# Patient Record
Sex: FEMALE | Race: WHITE | Employment: STUDENT | URBAN - METROPOLITAN AREA
[De-identification: names, ages, dates, MRNs, and addresses within clinical notes are randomized per-mention and may not be internally consistent; named-entity substitution may affect disease eponyms.]

---
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
## 2022-01-12 NOTE — ED PROVIDER NOTES
Emergency Department Provider Note  Location: [facility]  ED  4/23/2019     Patient Identification  Audie Solorio is a 23 y.o. female    Chief Complaint  Numbness (Woke up around 4:30 to go to the bathroom, felt right sided numbness along body. Right side of face and B forehead also affected, reported right side facial weakness and tongue numbness. Pt.'s mother reports that she was not making sense, couldnt complete words or sentences. Now says that she seems improved.)      Mode of Arrival  private car    HPI  (History provided by patient)  This is a 23 y.o. female presented today for right sided numbness. Patient says she fell asleep around 11pm and woke up around 4:30 to 5 AM.  She was laying on her right side. She woke up noticing the right side of her body, including her face, arm, and leg feel numb. She initially thought it was because she was laying on her right side. She got up and walk around for a couple minutes and the sensation did not resolve. She panicked and went to her mom's room asking for help. She says she couldn't even complete her sentences because she was panicking. No other she calmed down she felt a little better. She states the numbness in the leg has resolved but she still has right-sided facial and arm numbness. She also has a left-sided headache but that is not unusual for her. Patient had a history of retinal detachment status post surgery in December 2017. She states ever since that, she has frequent headaches. This headache is 6/10 intensity and felt similar to prior episodes of headache. She denies prior episodes of numbness associated with the headache. ROS  10 systems reviewed, pertinent positives per HPI otherwise noted to be negative    I have reviewed the following nursing documentation:  Allergies: No Known Allergies    Past medical history:  has no past medical history on file.     Past surgical history:  has a past surgical history that includes Knee arthroscopy (Right, 11/5/13); retinal laser (Left, 2018); and Retinal detachment surgery (Right, 2017). Home medications:   Prior to Admission medications    Not on File       Social history:  reports that she has never smoked. She has never used smokeless tobacco. She reports that she has current or past drug history. Drug: Marijuana. She reports that she does not drink alcohol. Family history:  History reviewed. No pertinent family history. Exam  ED Triage Vitals [04/23/19 0539]   BP Temp Temp Source Heart Rate Resp SpO2 Height Weight - Scale   (!) 147/77 98.7 °F (37.1 °C) Oral 96 16 97 % 5' 8\" (1.727 m) (!) 260 lb (117.9 kg)   Physical Exam   Constitutional: She is oriented to person, place, and time. She appears well-developed and well-nourished. No distress. HENT:   Head: Normocephalic and atraumatic. Eyes: Pupils are equal, round, and reactive to light. Conjunctivae and lids are normal. Right eye exhibits no discharge. Left eye exhibits no discharge. No scleral icterus. Neck: Neck supple. No tracheal deviation present. Cardiovascular: Normal rate, regular rhythm, normal heart sounds and intact distal pulses. No murmur heard. Pulmonary/Chest: Effort normal and breath sounds normal. No stridor. No respiratory distress. She has no wheezes. Abdominal: Soft. She exhibits no distension. There is no tenderness. There is no rebound and no guarding. Musculoskeletal: She exhibits no edema or deformity. Neurological: She is alert and oriented to person, place, and time. She has normal strength. She displays no tremor. A sensory deficit (patient reports decreased sensation to light touch on the right side of face and arm) is present. No cranial nerve deficit. She exhibits normal muscle tone. She displays a negative Romberg sign. Coordination and gait normal.   No facial droop, no slurred speech. Normal finger to nose, normal rapid motion, normal heel-to-toe gait.  Patient can also walk on her heels and her toes without difficulty. Skin: Skin is warm and dry. No rash noted. She is not diaphoretic. No cyanosis. No pallor. Psychiatric: She has a normal mood and affect. Her speech is normal.   Nursing note and vitals reviewed. MDM/ED Course  ED Medication Orders (From admission, onward)    Start Ordered     Status Ordering Provider    04/23/19 0615 04/23/19 0609  metoclopramide (REGLAN) injection 10 mg  ONCE      Acknowledged [facility]    04/23/19 0615 04/23/19 0609  diphenhydrAMINE (BENADRYL) injection 12.5 mg  ONCE      Acknowledged [facility]    04/23/19 0615 04/23/19 0609  0.9 % sodium chloride bolus  ONCE      Acknowledged [facility]    04/23/19 0615 04/23/19 0610  acetaminophen (TYLENOL) tablet 1,000 mg  ONCE      Acknowledged [facility]          Radiology  Ct Head Wo Contrast    Result Date: 4/23/2019  EXAMINATION: CT OF THE HEAD WITHOUT CONTRAST  4/23/2019 6:08 am TECHNIQUE: CT of the head was performed without the administration of intravenous contrast. Dose modulation, iterative reconstruction, and/or weight based adjustment of the mA/kV was utilized to reduce the radiation dose to as low as reasonably achievable. COMPARISON: None. HISTORY: ORDERING SYSTEM PROVIDED HISTORY: right sided face, arm numbness; right leg numbness resolved TECHNOLOGIST PROVIDED HISTORY: Has a \"code stroke\" or \"stroke alert\" been called? ->No Ordering Physician Provided Reason for Exam: Numbness (Woke up around 4:30 to go to the bathroom, felt right sided numbness along body. Right side of face and B forehead also affected, reported right side facial weakness and tongue numbness. Pt.'s mother reports that she was not making sense, couldnt complete words or sentences. Now says that she seems improved.) FINDINGS: BRAIN/VENTRICLES: There is no acute intracranial hemorrhage, mass effect or midline shift. No abnormal extra-axial fluid collection.   The gray-white differentiation is maintained without evidence of an acute infarct. There is no evidence of hydrocephalus. ORBITS: The visualized portion of the orbits demonstrate no acute abnormality. Scleral banding is noted on the right SINUSES: The visualized paranasal sinuses and mastoid air cells demonstrate no acute abnormality. SOFT TISSUES/SKULL:  No acute abnormality of the visualized skull or soft tissues. No acute intracranial abnormality.         Labs  Results for orders placed or performed during the hospital encounter of 04/23/19   CBC Auto Differential   Result Value Ref Range    WBC 8.9 4.0 - 11.0 K/uL    RBC 4.93 4.00 - 5.20 M/uL    Hemoglobin 14.8 12.0 - 16.0 g/dL    Hematocrit 43.0 36.0 - 48.0 %    MCV 87.3 80.0 - 100.0 fL    MCH 29.9 26.0 - 34.0 pg    MCHC 34.3 31.0 - 36.0 g/dL    RDW 13.5 12.4 - 15.4 %    Platelets 213 509 - 792 K/uL    MPV 8.2 5.0 - 10.5 fL    Neutrophils % 56.3 %    Lymphocytes % 31.9 %    Monocytes % 9.0 %    Eosinophils % 2.3 %    Basophils % 0.5 %    Neutrophils # 5.0 1.7 - 7.7 K/uL    Lymphocytes # 2.8 1.0 - 5.1 K/uL    Monocytes # 0.8 0.0 - 1.3 K/uL    Eosinophils # 0.2 0.0 - 0.6 K/uL    Basophils # 0.0 0.0 - 0.2 K/uL   Comprehensive Metabolic Panel w/ Reflex to MG   Result Value Ref Range    Sodium 140 136 - 145 mmol/L    Potassium reflex Magnesium 3.7 3.5 - 5.1 mmol/L    Chloride 105 99 - 110 mmol/L    CO2 23 21 - 32 mmol/L    Anion Gap 12 3 - 16    Glucose 117 (H) 70 - 99 mg/dL    BUN 8 7 - 20 mg/dL    CREATININE 0.8 0.6 - 1.1 mg/dL    GFR Non-African American >60 >60    GFR African American >60 >60    Calcium 9.6 8.3 - 10.6 mg/dL    Total Protein 7.8 6.4 - 8.2 g/dL    Alb 4.3 3.4 - 5.0 g/dL    Albumin/Globulin Ratio 1.2 1.1 - 2.2    Total Bilirubin <0.2 0.0 - 1.0 mg/dL    Alkaline Phosphatase 85 40 - 129 U/L    ALT 13 10 - 40 U/L    AST 16 15 - 37 U/L    Globulin 3.5 g/dL   Magnesium   Result Value Ref Range    Magnesium 2.00 1.80 - 2.40 mg/dL   HCG Qualitative, Serum   Result Value Ref Range    hCG Qual Negative Detects HCG level >10 MIU/mL         - Patient seen and evaluated in room 15.  23 y.o. female presented for right sided facial and arm numbness that was starting to improve but still present; she also had right leg numbness that already resolved prior to arrival.  She has a HA but she states that is a typical HA for her. TIA/Stroke, SAH, atypical migraine, all on the differential ddx. NIH Stroke Scale     Interval: Baseline  Time: 5:58am AM  Person Administering Scale: Ananda Reis   Administer stroke scale items in the order listed. Record performance in each category after each subscale exam. Do not go back and change scores. Follow directions provided for each exam technique. Scores should reflect what the patient does, not what the clinician thinks the patient can do. The clinician should record answers while administering the exam and work quickly. Except where indicated, the patient should not be coached (i.e., repeated requests to patient to make a special effort). 1a  Level of consciousness: 0=alert; keenly responsive   1b. LOC questions:  0=Performs both tasks correctly   1c. LOC commands: 0=Performs both tasks correctly   2. Best Gaze: 0=normal   3. Visual: 0=No visual loss   4. Facial Palsy: 0=Normal symmetric movement   5a. Motor left arm: 0=No drift, limb holds 90 (or 45) degrees for full 10 seconds   5b. Motor right arm: 0=No drift, limb holds 90 (or 45) degrees for full 10 seconds   6a. motor left le=No drift, limb holds 90 (or 45) degrees for full 10 seconds   6b  Motor right le=No drift, limb holds 90 (or 45) degrees for full 10 seconds   7. Limb Ataxia: 0=Absent   8. Sensory: 1=Mild to moderate sensory loss; patient feels pinprick is less sharp or is dull on the affected side; there is a loss of superficial pain with pinprick but patient is aware She is being touched   9. Best Language:  0=No aphasia, normal   10. Dysarthria: 0=Normal   11.  Extinction and Inattention: 0=No abnormality         Total:  1        Assume this is TIA/Stroke, NIHSS 1, too low for tPA. Symptoms are also not debilitating and also improving. Not worth the risk of tPA. With HA, need to also consider SAH and atypical migraine.     - Patient was given IVF, Reglan, Benadryl in the ED. Upon reassessment, patient reported feeling better but still had some nausea so zofran ordered. Her numbness has pretty much resolved at this point  - Diagnostic studies reviewed and results discussed with patient and mother. I explained that CT cannot exclude early stroke or small SAH. Given significant HA associated with symptoms, SAH higher on the differential than stroke but her age does make SAH less likely. Atypical migraine likely but cannot exclude other pathology without further evaluation. - patient declined LP. She was observed in the ED. She felt better and again declined LP. Mother present to witness the conversation. We will refer her to neurology and PCP for follow-up. I recommended lumbar puncture to exclude subarachnoid hemorrhage and/or meningitis. The risks and benefits of lumbar puncture were discussed with Gissell Hopkins. Questions were sought and answered and the patient declined to have it performed. I explained that CT alone does not rule out bleeding in the head and cannot diagnose meningitis. I also explained that he/she is taking risks including but not limited to stroke, bleeding,permanent disability, pain, and suffering. Gissell Hopkins voiced understanding and accepted this risk. I explained that we are glad to see him/her again and encouraged him/her to return to have the work-up completed. Gissell Hopkins was told to return immediately for worsening symptoms, syncope, new numbness/weakness/tingling, or any other worrisome concerns.      I estimate there is LOW risk for SUBARACHNOID HEMORRHAGE, MENINGITIS, INTRACRANIAL HEMORRHAGE, SUBDURAL HEMATOMA, OR STROKE, thus I consider the discharge disposition reasonable. Audie Solorio and I have discussed the diagnosis and risks, and we agree with discharging home to follow-up with their primary doctor. We also discussed returning to the Emergency Department immediately if new or worsening symptoms occur. We have discussed the symptoms which are most concerning (e.g., changing or worsening pain, weakness, vomiting, fever) that necessitate immediate return. Clinical Impression:  1. Acute nonintractable headache, unspecified headache type    2. Numbness on right side        Disposition:  Discharge to home in good condition. Blood pressure (!) 147/77, pulse 96, temperature 98.7 °F (37.1 °C), temperature source Oral, resp. rate 16, height 5' 8\" (1.727 m), weight (!) 260 lb (117.9 kg), last menstrual period 03/25/2019, SpO2 97 %, not currently breastfeeding. Patient was given scripts for the following medications. I counseled patient how to take these medications. New Prescriptions    No medications on file       This chart was generated in part by using Dragon Dictation system and may contain errors related to that system including errors in grammar, punctuation, and spelling, as well as words and phrases that may be inappropriate. If there are any questions or concerns please feel free to contact the dictating provider for clarification.      Laurel Page MD  [address] [place] [address] Phill Valero MD  04/25/19 1618 Excisional Biopsy Additional Text (Leave Blank If You Do Not Want): The margin was drawn around the clinically apparent lesion. An elliptical shape was then drawn on the skin incorporating the lesion and margins.  Incisions were then made along these lines to the appropriate tissue plane and the lesion was extirpated.